# Patient Record
Sex: FEMALE | URBAN - METROPOLITAN AREA
[De-identification: names, ages, dates, MRNs, and addresses within clinical notes are randomized per-mention and may not be internally consistent; named-entity substitution may affect disease eponyms.]

---

## 2018-11-05 NOTE — PATIENT DISCUSSION
Pt edu regarding small cystic changes, not affecting vision. So treatment not recommended at this time. We reviewed the signs and symptoms of retinal tear/retinal detachment and the importance of prompt evaluation should there be increasing floaters, new flashing lights, or decreasing peripheral vision in either eye at any time. Advised to call with any vision changes.

## 2019-02-08 ENCOUNTER — IMPORTED ENCOUNTER (OUTPATIENT)
Dept: URBAN - METROPOLITAN AREA CLINIC 9 | Facility: CLINIC | Age: 84
End: 2019-02-08

## 2019-03-04 NOTE — PATIENT DISCUSSION
Stable from last visit.  Treatment not recommended at this time. We reviewed the signs and symptoms of retinal tear/retinal detachment and the importance of prompt evaluation should there be increasing floaters, new flashing lights, or decreasing peripheral vision in either eye at any time. Advised to call with any vision changes.

## 2019-03-19 ENCOUNTER — IMPORTED ENCOUNTER (OUTPATIENT)
Dept: URBAN - METROPOLITAN AREA CLINIC 9 | Facility: CLINIC | Age: 84
End: 2019-03-19

## 2019-04-05 ENCOUNTER — IMPORTED ENCOUNTER (OUTPATIENT)
Dept: URBAN - METROPOLITAN AREA CLINIC 9 | Facility: CLINIC | Age: 84
End: 2019-04-05

## 2019-05-03 ENCOUNTER — IMPORTED ENCOUNTER (OUTPATIENT)
Dept: URBAN - METROPOLITAN AREA CLINIC 9 | Facility: CLINIC | Age: 84
End: 2019-05-03

## 2019-09-23 NOTE — PATIENT DISCUSSION
Very mild and only first time reading.  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  Watch without tx at this time.

## 2019-09-23 NOTE — PATIENT DISCUSSION
No DME seen on exam today. No treatment at this time. Observation recommended.  A1C still too high, pt states recently got a continuous glucose monitor and it's helping.

## 2020-02-24 NOTE — PATIENT DISCUSSION
No DME seen on exam today. No treatment at this time. But DR is worsening on DFE.  A1C still too high, pt states recently got a continuous glucose monitor and it's helping. Since worsening would recommend seeing patient sooner, in 4 months.

## 2020-06-15 NOTE — PATIENT DISCUSSION
Pt edu VMA is now VMTS but vision remains stable. We reviewed the signs and symptoms of retinal tear/retinal detachment and the importance of prompt evaluation should there be increasing floaters, new flashing lights, or decreasing peripheral vision in either eye at any time.

## 2020-11-16 NOTE — PATIENT DISCUSSION
Pt edu VMTS stable. We reviewed the signs and symptoms of retinal tear/retinal detachment and the importance of prompt evaluation should there be increasing floaters, new flashing lights, or decreasing peripheral vision in either eye at any time.

## 2021-05-10 NOTE — PATIENT DISCUSSION
Pt edu VMTS stable, looks to be slowly releasing on own. Will monitor for now. We reviewed the signs and symptoms of retinal tear/retinal detachment and the importance of prompt evaluation should there be increasing floaters, new flashing lights, or decreasing peripheral vision in either eye at any time.

## 2021-10-16 ASSESSMENT — VISUAL ACUITY
OD_SC: 20/25 SN
OS_SC: 20/25 SN
OD_SC: 20/25 SN
OD_SC: 20/25 SN
OS_SC: 20/25 SN
OD_SC: 20/25 SN
OS_SC: 20/25 SN
OS_SC: 20/25 SN

## 2022-06-19 RX ORDER — DULOXETIN HYDROCHLORIDE 30 MG/1
1 CAPSULE, DELAYED RELEASE ORAL
COMMUNITY
End: 2022-08-24 | Stop reason: CLARIF

## 2022-06-19 RX ORDER — DONEPEZIL HYDROCHLORIDE 5 MG/1
TABLET, FILM COATED ORAL
COMMUNITY
Start: 2022-05-27

## 2022-06-19 RX ORDER — ATORVASTATIN CALCIUM 40 MG/1
TABLET, FILM COATED ORAL
COMMUNITY
End: 2022-08-24 | Stop reason: CLARIF

## 2022-06-19 RX ORDER — GABAPENTIN 100 MG/1
CAPSULE ORAL
COMMUNITY

## 2022-06-19 RX ORDER — ROSUVASTATIN CALCIUM 20 MG/1
TABLET, COATED ORAL
COMMUNITY

## 2022-06-19 RX ORDER — ISOSORBIDE MONONITRATE 30 MG/1
TABLET, EXTENDED RELEASE ORAL
COMMUNITY

## 2022-06-19 RX ORDER — AMLODIPINE BESYLATE 2.5 MG/1
TABLET ORAL
COMMUNITY

## 2022-08-24 PROBLEM — E78.5 BORDERLINE HYPERLIPIDEMIA: Status: ACTIVE | Noted: 2022-08-24

## 2022-08-24 PROBLEM — I48.0 PAF (PAROXYSMAL ATRIAL FIBRILLATION) (HCC): Status: ACTIVE | Noted: 2022-08-24

## 2022-08-24 PROBLEM — M25.552 LEFT HIP PAIN: Status: ACTIVE | Noted: 2022-08-24

## 2022-08-24 PROBLEM — M25.562 ACUTE PAIN OF LEFT KNEE: Status: ACTIVE | Noted: 2022-08-24

## 2022-08-24 PROBLEM — I25.10 ARTERIOSCLEROSIS OF CORONARY ARTERY: Status: ACTIVE | Noted: 2022-08-24

## 2022-08-24 PROBLEM — E11.40 TYPE 2 DIABETES MELLITUS WITH DIABETIC NEUROPATHY (HCC): Status: ACTIVE | Noted: 2022-08-24

## 2022-08-24 PROBLEM — I48.92 ATRIAL FLUTTER (HCC): Status: RESOLVED | Noted: 2022-08-24 | Resolved: 2022-08-24

## 2022-08-24 PROBLEM — E11.40 TYPE 2 DIABETES MELLITUS WITH DIABETIC NEUROPATHY (HCC): Status: RESOLVED | Noted: 2022-08-24 | Resolved: 2022-08-24

## 2022-08-24 PROBLEM — J40 BRONCHITIS: Status: ACTIVE | Noted: 2022-08-24

## 2022-08-24 PROBLEM — N32.81 OAB (OVERACTIVE BLADDER): Status: ACTIVE | Noted: 2022-08-24

## 2022-08-24 PROBLEM — I48.92 ATRIAL FLUTTER (HCC): Status: ACTIVE | Noted: 2022-08-24

## 2022-08-24 PROBLEM — I10 BENIGN ESSENTIAL HTN: Status: ACTIVE | Noted: 2022-08-24

## 2022-08-24 PROBLEM — M25.571 RIGHT ANKLE PAIN: Status: ACTIVE | Noted: 2022-08-24

## 2022-08-24 PROBLEM — F41.9 ANXIETY AND DEPRESSION: Status: ACTIVE | Noted: 2022-08-24

## 2022-08-24 PROBLEM — J31.0 CHRONIC RHINITIS: Status: RESOLVED | Noted: 2022-08-24 | Resolved: 2022-08-24

## 2022-08-24 PROBLEM — M25.551 RIGHT HIP PAIN: Status: ACTIVE | Noted: 2022-08-24

## 2022-08-24 PROBLEM — F51.01 PRIMARY INSOMNIA: Status: ACTIVE | Noted: 2022-08-24

## 2022-08-24 PROBLEM — I25.810 CORONARY ARTERY DISEASE INVOLVING AUTOLOGOUS ARTERY CORONARY BYPASS GRAFT WITHOUT ANGINA PECTORIS: Status: ACTIVE | Noted: 2022-08-24

## 2022-08-24 PROBLEM — R93.5 ABNORMAL CT OF THE ABDOMEN: Status: RESOLVED | Noted: 2022-08-24 | Resolved: 2022-08-24

## 2022-08-24 PROBLEM — J40 BRONCHITIS: Status: RESOLVED | Noted: 2022-08-24 | Resolved: 2022-08-24

## 2022-08-24 PROBLEM — G62.9 NEUROPATHY: Status: ACTIVE | Noted: 2022-08-24

## 2022-08-24 PROBLEM — R93.5 ABNORMAL CT OF THE ABDOMEN: Status: ACTIVE | Noted: 2022-08-24

## 2022-08-24 PROBLEM — M16.11 ARTHRITIS OF RIGHT HIP: Status: ACTIVE | Noted: 2022-08-24

## 2022-08-24 PROBLEM — F32.A ANXIETY AND DEPRESSION: Status: ACTIVE | Noted: 2022-08-24

## 2022-08-24 PROBLEM — B00.1 RECURRENT COLD SORES: Status: ACTIVE | Noted: 2022-08-24

## 2022-08-24 PROBLEM — M17.11 ARTHRITIS OF RIGHT KNEE: Status: ACTIVE | Noted: 2022-08-24

## 2022-08-24 PROBLEM — M85.869 OSTEOPENIA OF LOWER LEG: Status: ACTIVE | Noted: 2022-08-24

## 2022-08-24 PROBLEM — J30.2 ACUTE SEASONAL ALLERGIC RHINITIS: Status: RESOLVED | Noted: 2022-08-24 | Resolved: 2022-08-24

## 2022-08-24 PROBLEM — M25.571 RIGHT ANKLE PAIN: Status: RESOLVED | Noted: 2022-08-24 | Resolved: 2022-08-24

## 2022-08-24 PROBLEM — M25.562 ACUTE PAIN OF LEFT KNEE: Status: RESOLVED | Noted: 2022-08-24 | Resolved: 2022-08-24

## 2022-08-24 PROBLEM — G30.1 LATE ONSET ALZHEIMER'S DEMENTIA WITHOUT BEHAVIORAL DISTURBANCE (HCC): Status: ACTIVE | Noted: 2022-08-24

## 2022-08-24 PROBLEM — H90.3 SENSORINEURAL HEARING LOSS (SNHL) OF BOTH EARS: Status: ACTIVE | Noted: 2022-08-24

## 2022-08-24 PROBLEM — I20.9 ANGINA PECTORIS (HCC): Status: ACTIVE | Noted: 2022-08-24

## 2022-08-24 PROBLEM — I65.23 ATHEROSCLEROSIS OF BOTH CAROTID ARTERIES: Status: ACTIVE | Noted: 2022-08-24

## 2022-08-24 PROBLEM — F02.80 LATE ONSET ALZHEIMER'S DEMENTIA WITHOUT BEHAVIORAL DISTURBANCE (HCC): Status: ACTIVE | Noted: 2022-08-24

## 2022-08-24 PROBLEM — M25.551 RIGHT HIP PAIN: Status: RESOLVED | Noted: 2022-08-24 | Resolved: 2022-08-24

## 2022-08-24 PROBLEM — J31.0 CHRONIC RHINITIS: Status: ACTIVE | Noted: 2022-08-24

## 2022-08-24 PROBLEM — J30.9 ALLERGIC RHINITIS: Status: ACTIVE | Noted: 2022-08-24

## 2022-08-24 PROBLEM — M25.552 LEFT HIP PAIN: Status: RESOLVED | Noted: 2022-08-24 | Resolved: 2022-08-24

## 2022-08-24 PROBLEM — M17.12 ARTHRITIS OF LEFT KNEE: Status: ACTIVE | Noted: 2022-08-24

## 2022-08-24 PROBLEM — J30.2 ACUTE SEASONAL ALLERGIC RHINITIS: Status: ACTIVE | Noted: 2022-08-24

## 2022-08-24 PROBLEM — L65.9 ALOPECIA: Status: ACTIVE | Noted: 2022-08-24
